# Patient Record
Sex: MALE | Race: WHITE | NOT HISPANIC OR LATINO
[De-identification: names, ages, dates, MRNs, and addresses within clinical notes are randomized per-mention and may not be internally consistent; named-entity substitution may affect disease eponyms.]

---

## 2021-06-21 ENCOUNTER — APPOINTMENT (OUTPATIENT)
Dept: PLASTIC SURGERY | Facility: CLINIC | Age: 56
End: 2021-06-21
Payer: COMMERCIAL

## 2021-06-21 DIAGNOSIS — Z78.9 OTHER SPECIFIED HEALTH STATUS: ICD-10-CM

## 2021-06-21 PROBLEM — Z00.00 ENCOUNTER FOR PREVENTIVE HEALTH EXAMINATION: Status: ACTIVE | Noted: 2021-06-21

## 2021-06-21 PROCEDURE — 99072 ADDL SUPL MATRL&STAF TM PHE: CPT

## 2021-06-21 PROCEDURE — 99204 OFFICE O/P NEW MOD 45 MIN: CPT

## 2021-06-21 NOTE — PHYSICAL EXAM
[de-identified] : Left 5th digit proximal phalanx significant warmth, erythema, and swelling; mild swelling to remainder of hand, minimal pain on passive ROM, able to actively flex/extend but severely limited due to swelling, no open wounds  [de-identified] : well-appearing, NAD

## 2021-06-21 NOTE — ASSESSMENT
[FreeTextEntry1] : 57 y/o M with acutely swollen and painful left 5th digit consistent with gouty attack\par \par -Recommend Tylenol ATC\par -Rheumatology evaluation\par -Advised to call office for worsening redness, swelling, pain, or new f/c / drainage\par -F/u PRN\par \par Pt seen and examined with Dr. Randle\par \par Due to COVID-19, pre-visit patient instructions were explained to the patient and their symptoms were checked upon arrival. Masks were used by the healthcare provider and staff and the examination room was cleaned after the patient visit concluded\par

## 2021-06-21 NOTE — HISTORY OF PRESENT ILLNESS
[FreeTextEntry1] : Pt is a 57 y/o M, RHD, with PMH of hypothyroidism and HTN who presents for evaluation of left 5th digit pain and swelling since 6/17/21. Pt states it began spontaneously, woke up with swelling. Went to [facility] 2 days later and was diagnosed with possible OA. Stated PO Keflex for possible infection. Reports it has not improved. Denies family or personal h/o RA or gout.\par \par Nonsmoker, nondiabetic

## 2021-06-26 ENCOUNTER — NON-APPOINTMENT (OUTPATIENT)
Age: 56
End: 2021-06-26

## 2022-07-05 ENCOUNTER — LABORATORY RESULT (OUTPATIENT)
Age: 57
End: 2022-07-05

## 2022-07-05 ENCOUNTER — APPOINTMENT (OUTPATIENT)
Age: 57
End: 2022-07-05

## 2022-07-05 ENCOUNTER — NON-APPOINTMENT (OUTPATIENT)
Age: 57
End: 2022-07-05

## 2022-07-05 VITALS
HEIGHT: 67 IN | HEART RATE: 71 BPM | SYSTOLIC BLOOD PRESSURE: 126 MMHG | BODY MASS INDEX: 41.59 KG/M2 | RESPIRATION RATE: 14 BRPM | WEIGHT: 265 LBS | DIASTOLIC BLOOD PRESSURE: 76 MMHG | OXYGEN SATURATION: 98 %

## 2022-07-05 DIAGNOSIS — Z86.79 PERSONAL HISTORY OF OTHER DISEASES OF THE CIRCULATORY SYSTEM: ICD-10-CM

## 2022-07-05 DIAGNOSIS — G47.33 OBSTRUCTIVE SLEEP APNEA (ADULT) (PEDIATRIC): ICD-10-CM

## 2022-07-05 DIAGNOSIS — J45.909 UNSPECIFIED ASTHMA, UNCOMPLICATED: ICD-10-CM

## 2022-07-05 DIAGNOSIS — Z86.39 PERSONAL HISTORY OF OTHER ENDOCRINE, NUTRITIONAL AND METABOLIC DISEASE: ICD-10-CM

## 2022-07-05 DIAGNOSIS — M10.9 GOUT, UNSPECIFIED: ICD-10-CM

## 2022-07-05 PROCEDURE — 99204 OFFICE O/P NEW MOD 45 MIN: CPT

## 2022-07-05 RX ORDER — AZELASTINE HYDROCHLORIDE 205.5 UG/1
0.15 SPRAY, METERED NASAL
Qty: 1 | Refills: 5 | Status: ACTIVE | COMMUNITY
Start: 2022-07-05 | End: 1900-01-01

## 2022-07-05 NOTE — DISCUSSION/SUMMARY
[FreeTextEntry1] : ASTHMA ON TRELEGY?\par PFT\par WEIGHT LOSS\par \par PND\par \par OS DO NOT WANT SLEEP STUDY FOR NOW

## 2022-07-08 ENCOUNTER — APPOINTMENT (OUTPATIENT)
Dept: ORTHOPEDIC SURGERY | Facility: CLINIC | Age: 57
End: 2022-07-08

## 2022-07-08 DIAGNOSIS — M25.511 PAIN IN RIGHT SHOULDER: ICD-10-CM

## 2022-07-08 DIAGNOSIS — Z78.9 OTHER SPECIFIED HEALTH STATUS: ICD-10-CM

## 2022-07-08 PROCEDURE — 99213 OFFICE O/P EST LOW 20 MIN: CPT

## 2022-07-08 NOTE — HISTORY OF PRESENT ILLNESS
[de-identified] : Patient here for about knee.\par Patient states that his left knee is worse than the right this time.\par Patient states that he is able to walk a good distance, but after walking he has severe pain and stiffness to his knees.\par Patient states that he has severe pain when getting up from a sitting position.\par Patient states that he pain could be 7 to 8/10.\par Patient is states that he takes over-the-counter anti-inflammatories for pain as needed.\par Patient states that in the past he receive Euflexxa injection to his knees with good improvement of symptoms.\par \par \par Patient also complains about right shoulder pain, patient states that he would like to do physical therapy for his shoulder.\par Severe pain and nighttime.

## 2022-07-08 NOTE — DISCUSSION/SUMMARY
[de-identified] :   We discussed treatment of osteoarthritis of the knee such as anti-inflammatories, physical therapy, cortisone injection and gel injection, since he responded very well to the viscosupplementation injection he would like to continue with these injections, I agree.  \par Patient has Euflexxa injection in the past,  I will send for authorization for these injections.  \par Patient was over an appointment to come back in about 4-6 weeks to receive the gel injection to his right and left knee.  \par Patient would like some anti-inflammatories for pain, prescription will be sent to the pharmacy.  \par \par  Patient also requesting a prescription for physical therapy for his right shoulder, prescription was given.  \par \par \par Follow-up in 4-6 weeks for Euflexxa bilaterally.\par \par

## 2022-07-08 NOTE — IMAGING
[de-identified] :   On examination of the right and left knee, patient has large knee due to body habitus.\par No effusion noted on these knees bilateral.\par Mild generalized swelling to his knees bilaterally.\par No ecchymosis, no erythema.\par Patient has tenderness to palpation over the medial joint line bilaterally right worse than left.\par Patient has crepitation to flexion-extension of the knees bilateral.\par Patient has positive patella grind to the left worse than the right.\par Calf is soft, nontender.\par Good motor strength to knee flexion extension.\par Neurovascular intact.\par \par X-ray of the right and left knee were reviewed in office today, this x-rays were taking at a three views office visit, x-rays shows significant joint narrowing space over the medial compartment on the right knee and severe patellofemoral osteoarthritis to the left.\par

## 2022-08-12 ENCOUNTER — APPOINTMENT (OUTPATIENT)
Dept: ORTHOPEDIC SURGERY | Facility: CLINIC | Age: 57
End: 2022-08-12

## 2022-08-19 ENCOUNTER — APPOINTMENT (OUTPATIENT)
Dept: ORTHOPEDIC SURGERY | Facility: CLINIC | Age: 57
End: 2022-08-19

## 2022-08-19 PROCEDURE — 20610 DRAIN/INJ JOINT/BURSA W/O US: CPT | Mod: 50

## 2022-08-19 NOTE — PROCEDURE
[Large Joint Injection] : Large joint injection [Bilateral] : bilaterally of the [Knee] : knee [Pain] : pain [X-ray evidence of Osteoarthritis on this or prior visit] : x-ray evidence of Osteoarthritis on this or prior visit [Euflexxa] : Euflexxa [#1] : series #1 [] : Patient tolerated procedure well

## 2022-08-26 ENCOUNTER — APPOINTMENT (OUTPATIENT)
Dept: ORTHOPEDIC SURGERY | Facility: CLINIC | Age: 57
End: 2022-08-26

## 2022-08-26 PROCEDURE — 20610 DRAIN/INJ JOINT/BURSA W/O US: CPT | Mod: 50

## 2022-08-26 NOTE — PHYSICAL EXAM
[Bilateral] : knee bilaterally [5___] : hamstring 5[unfilled]/5 [Equivocal] : equivocal Effie [] : non-antalgic

## 2022-08-26 NOTE — DISCUSSION/SUMMARY
[de-identified] : An injection of Euflexxa #2 was injected into bilateral knees after verbal consent using sterile technique. The risks, benefits, and alternatives to Viscosupplementation injection were explained in full to the patient. Risks outlined include but are not limited to infection, sepsis, bleeding, scarring, skin discoloration, temporary increase in pain, syncopal episode, failure to resolve symptoms, allergic reaction, and symptom recurrence. Signs and symptoms of infection reviewed and patient advised to call immediately for redness, fevers, and/or chills. Patient understood the risks. All questions were answered. Sterile technique was used without complications. The patient tolerated the procedure well. Ice tonight to the injection site. \par \par  follow-up in 1 week for final injection\par \par  seen under supervision of Dr. Hadley.

## 2022-09-02 ENCOUNTER — APPOINTMENT (OUTPATIENT)
Dept: ORTHOPEDIC SURGERY | Facility: CLINIC | Age: 57
End: 2022-09-02

## 2022-09-02 PROCEDURE — 20610 DRAIN/INJ JOINT/BURSA W/O US: CPT | Mod: 50

## 2022-09-02 NOTE — HISTORY OF PRESENT ILLNESS
[de-identified] :  patient is here for the 3rd and final Euflexxa injection to his knees bilaterally.  \par Patient does admits to improvement of symptoms.

## 2022-09-02 NOTE — PROCEDURE
[Large Joint Injection] : Large joint injection [Bilateral] : bilaterally of the [Pain] : pain [X-ray evidence of Osteoarthritis on this or prior visit] : x-ray evidence of Osteoarthritis on this or prior visit [Alcohol] : alcohol [Betadine] : betadine [Ethyl Chloride sprayed topically] : ethyl chloride sprayed topically [Sterile technique used] : sterile technique used [Euflexxa] : Euflexxa [#3] : series #3 [] : Patient tolerated procedure well

## 2022-09-02 NOTE — DISCUSSION/SUMMARY
[de-identified] :   Patient was advised that viscosupplementation injection should have the most of its effect in about 6 weeks from the last injection and  the affect of the injection can last from 6 months to a year, patient was advised to make an appointment for 6 months to re-evaluate the knee of another series of injections.

## 2022-09-08 ENCOUNTER — APPOINTMENT (OUTPATIENT)
Dept: ORTHOPEDIC SURGERY | Facility: CLINIC | Age: 57
End: 2022-09-08

## 2022-09-08 ENCOUNTER — APPOINTMENT (OUTPATIENT)
Dept: ORTHOPEDIC SURGERY | Facility: AMBULATORY MEDICAL SERVICES | Age: 57
End: 2022-09-08

## 2022-09-22 ENCOUNTER — APPOINTMENT (OUTPATIENT)
Dept: ORTHOPEDIC SURGERY | Facility: CLINIC | Age: 57
End: 2022-09-22

## 2022-09-29 ENCOUNTER — OUTPATIENT (OUTPATIENT)
Dept: OUTPATIENT SERVICES | Facility: HOSPITAL | Age: 57
LOS: 1 days | Discharge: HOME | End: 2022-09-29

## 2022-09-29 PROCEDURE — 94727 GAS DIL/WSHOT DETER LNG VOL: CPT | Mod: 26

## 2022-09-29 PROCEDURE — 94729 DIFFUSING CAPACITY: CPT | Mod: 26

## 2022-09-29 PROCEDURE — 94060 EVALUATION OF WHEEZING: CPT | Mod: 26

## 2023-06-30 ENCOUNTER — APPOINTMENT (OUTPATIENT)
Dept: ORTHOPEDIC SURGERY | Facility: CLINIC | Age: 58
End: 2023-06-30
Payer: COMMERCIAL

## 2023-06-30 PROCEDURE — 99213 OFFICE O/P EST LOW 20 MIN: CPT

## 2023-06-30 RX ORDER — HYALURONATE SODIUM 20 MG/2 ML
20 SYRINGE (ML) INTRAARTICULAR
Qty: 2 | Refills: 0 | Status: ACTIVE | OUTPATIENT
Start: 2023-06-30

## 2023-06-30 NOTE — DISCUSSION/SUMMARY
[de-identified] :   We discussed treatment of osteoarthritis of the knee such as anti-inflammatories, physical therapy, cortisone injection and gel injection, since he responded very well to the viscosupplementation injection he would like to continue with these injections, I agree.  \par Patient has Euflexxa injection in the past,  I will send for authorization for these injections.  \par Patient was over an appointment to come back in about 4-6 weeks to receive the gel injection to his right and left knee.  \par Patient would like some anti-inflammatories for pain, prescription will be sent to the pharmacy.  \par \par \par \par \par Follow-up in 4-6 weeks for Euflexxa bilaterally.\par \par

## 2023-06-30 NOTE — HISTORY OF PRESENT ILLNESS
[de-identified] : Patient here for bilateral knee.\par Patient states that his left knee is worse than the right this time.\par Patient states that he is able to walk a good distance, but after walking he has severe pain and stiffness to his knees.\par Patient states that he has severe pain when getting up from a sitting position.\par Patient states that he pain could be 7 to 8/10.\par Patient is states that he takes over-the-counter anti-inflammatories for pain as needed.\par Patient states that in the past he receive Euflexxa injection to his knees with good improvement of symptoms.\par \par \par

## 2023-06-30 NOTE — IMAGING
[de-identified] :   On examination of the right and left knee, patient has large knee due to body habitus.\par No effusion noted on these knees bilateral.\par Mild generalized swelling to his knees bilaterally.\par No ecchymosis, no erythema.\par Patient has tenderness to palpation over the medial joint line bilaterally right worse than left.\par Patient has crepitation to flexion-extension of the knees bilateral.\par Patient has positive patella grind to the left worse than the right.\par Calf is soft, nontender.\par Good motor strength to knee flexion extension.\par Neurovascular intact.\par \par X-ray of the right and left knee were reviewed in office on previous visit, this x-rays were taking at a three views office visit, x-rays shows significant joint narrowing space over the medial compartment on the right knee and severe patellofemoral osteoarthritis to the left.\par

## 2023-08-18 ENCOUNTER — APPOINTMENT (OUTPATIENT)
Dept: ORTHOPEDIC SURGERY | Facility: CLINIC | Age: 58
End: 2023-08-18
Payer: COMMERCIAL

## 2023-08-18 PROCEDURE — 99212 OFFICE O/P EST SF 10 MIN: CPT | Mod: 25

## 2023-08-18 PROCEDURE — 20610 DRAIN/INJ JOINT/BURSA W/O US: CPT | Mod: 50

## 2023-08-18 PROCEDURE — 99213 OFFICE O/P EST LOW 20 MIN: CPT | Mod: 25

## 2023-08-18 NOTE — DISCUSSION/SUMMARY
[de-identified] : Impression: Bilateral knee osteoarthritis.  Plan: Euflexxa injection to his knees bilaterally, injection 1 today.  Follow-up: 1 week for injection #2

## 2023-08-18 NOTE — PROCEDURE
[Large Joint Injection] : Large joint injection [Bilateral] : bilaterally of the [Knee] : knee [Pain] : pain [X-ray evidence of Osteoarthritis on this or prior visit] : x-ray evidence of Osteoarthritis on this or prior visit [Alcohol] : alcohol [Betadine] : betadine [Ethyl Chloride sprayed topically] : ethyl chloride sprayed topically [Sterile technique used] : sterile technique used [Euflexxa(20mg)] : 20mg of Euflexxa [#1] : series #1 [] : Patient tolerated procedure well

## 2023-08-25 ENCOUNTER — APPOINTMENT (OUTPATIENT)
Dept: ORTHOPEDIC SURGERY | Facility: CLINIC | Age: 58
End: 2023-08-25
Payer: COMMERCIAL

## 2023-08-25 PROCEDURE — 20610 DRAIN/INJ JOINT/BURSA W/O US: CPT | Mod: 50

## 2023-08-25 NOTE — DISCUSSION/SUMMARY
[de-identified] : Impression: Bilateral knee osteoarthritis.  Plan: Euflexxa injection to his knees bilaterally, injection 1 today.  Follow-up: 1 week for injection #3

## 2023-08-25 NOTE — PROCEDURE
[Large Joint Injection] : Large joint injection [Bilateral] : bilaterally of the [Knee] : knee [Pain] : pain [X-ray evidence of Osteoarthritis on this or prior visit] : x-ray evidence of Osteoarthritis on this or prior visit [Alcohol] : alcohol [Betadine] : betadine [Ethyl Chloride sprayed topically] : ethyl chloride sprayed topically [Sterile technique used] : sterile technique used [Euflexxa(20mg)] : 20mg of Euflexxa [#2] : series #2 [] : Patient tolerated procedure well

## 2023-09-01 ENCOUNTER — APPOINTMENT (OUTPATIENT)
Dept: ORTHOPEDIC SURGERY | Facility: CLINIC | Age: 58
End: 2023-09-01
Payer: COMMERCIAL

## 2023-09-01 DIAGNOSIS — M17.0 BILATERAL PRIMARY OSTEOARTHRITIS OF KNEE: ICD-10-CM

## 2023-09-01 PROCEDURE — 99212 OFFICE O/P EST SF 10 MIN: CPT | Mod: 25

## 2023-09-01 PROCEDURE — 20610 DRAIN/INJ JOINT/BURSA W/O US: CPT | Mod: 50

## 2023-09-01 NOTE — DISCUSSION/SUMMARY
[de-identified] : Impression: Bilateral knee osteoarthritis.  Plan: Euflexxa injection to his knees bilaterally,  Follow-up: 6 months or as needed

## 2023-09-01 NOTE — PROCEDURE
[Large Joint Injection] : Large joint injection [Bilateral] : bilaterally of the [Knee] : knee [Pain] : pain [X-ray evidence of Osteoarthritis on this or prior visit] : x-ray evidence of Osteoarthritis on this or prior visit [Alcohol] : alcohol [Betadine] : betadine [Ethyl Chloride sprayed topically] : ethyl chloride sprayed topically [Sterile technique used] : sterile technique used [Euflexxa(20mg)] : 20mg of Euflexxa [#3] : series #3 [] : Patient tolerated procedure well

## 2023-09-01 NOTE — HISTORY OF PRESENT ILLNESS
[de-identified] : Patient is here for Euflexxa injection to his knees bilaterally, today the last injection of the series.

## 2024-06-07 ENCOUNTER — APPOINTMENT (OUTPATIENT)
Dept: ORTHOPEDIC SURGERY | Facility: CLINIC | Age: 59
End: 2024-06-07

## 2024-07-05 ENCOUNTER — APPOINTMENT (OUTPATIENT)
Dept: ORTHOPEDIC SURGERY | Facility: CLINIC | Age: 59
End: 2024-07-05
Payer: COMMERCIAL

## 2024-07-05 PROCEDURE — 99213 OFFICE O/P EST LOW 20 MIN: CPT

## 2024-07-05 RX ORDER — HYLAN G-F 20 16MG/2ML
48 SYRINGE (ML) INTRAARTICULAR
Qty: 2 | Refills: 0 | Status: ACTIVE | OUTPATIENT
Start: 2024-07-05

## 2024-07-19 ENCOUNTER — APPOINTMENT (OUTPATIENT)
Dept: ORTHOPEDIC SURGERY | Facility: CLINIC | Age: 59
End: 2024-07-19
Payer: COMMERCIAL

## 2024-07-19 DIAGNOSIS — M17.0 BILATERAL PRIMARY OSTEOARTHRITIS OF KNEE: ICD-10-CM

## 2024-07-19 PROCEDURE — 20610 DRAIN/INJ JOINT/BURSA W/O US: CPT | Mod: 50

## 2024-08-02 ENCOUNTER — APPOINTMENT (OUTPATIENT)
Dept: ORTHOPEDIC SURGERY | Facility: CLINIC | Age: 59
End: 2024-08-02

## 2025-01-10 ENCOUNTER — APPOINTMENT (OUTPATIENT)
Dept: ORTHOPEDIC SURGERY | Facility: CLINIC | Age: 60
End: 2025-01-10

## 2025-03-19 RX ORDER — HYALURONATE SODIUM, STABILIZED 60 MG/3 ML
60 SYRINGE (ML) INTRAARTICULAR
Qty: 2 | Refills: 0 | Status: ACTIVE | OUTPATIENT
Start: 2025-03-19

## 2025-03-28 ENCOUNTER — APPOINTMENT (OUTPATIENT)
Dept: ORTHOPEDIC SURGERY | Facility: CLINIC | Age: 60
End: 2025-03-28
Payer: COMMERCIAL

## 2025-03-28 DIAGNOSIS — M17.0 BILATERAL PRIMARY OSTEOARTHRITIS OF KNEE: ICD-10-CM

## 2025-03-28 PROCEDURE — 20610 DRAIN/INJ JOINT/BURSA W/O US: CPT | Mod: 50

## 2025-08-15 ENCOUNTER — APPOINTMENT (OUTPATIENT)
Dept: ORTHOPEDIC SURGERY | Facility: CLINIC | Age: 60
End: 2025-08-15
Payer: COMMERCIAL

## 2025-08-15 DIAGNOSIS — M17.0 BILATERAL PRIMARY OSTEOARTHRITIS OF KNEE: ICD-10-CM

## 2025-08-15 PROCEDURE — 99213 OFFICE O/P EST LOW 20 MIN: CPT | Mod: 25

## 2025-08-15 PROCEDURE — 20610 DRAIN/INJ JOINT/BURSA W/O US: CPT | Mod: RT

## 2025-08-15 PROCEDURE — 73562 X-RAY EXAM OF KNEE 3: CPT | Mod: RT
